# Patient Record
Sex: FEMALE | Race: WHITE | HISPANIC OR LATINO | URBAN - METROPOLITAN AREA
[De-identification: names, ages, dates, MRNs, and addresses within clinical notes are randomized per-mention and may not be internally consistent; named-entity substitution may affect disease eponyms.]

---

## 2023-10-31 ENCOUNTER — EMERGENCY (EMERGENCY)
Facility: HOSPITAL | Age: 68
LOS: 1 days | Discharge: HOME | End: 2023-10-31
Attending: EMERGENCY MEDICINE
Payer: MEDICAID

## 2023-10-31 VITALS
HEART RATE: 78 BPM | WEIGHT: 161.82 LBS | RESPIRATION RATE: 17 BRPM | OXYGEN SATURATION: 98 % | TEMPERATURE: 98 F | SYSTOLIC BLOOD PRESSURE: 156 MMHG | DIASTOLIC BLOOD PRESSURE: 73 MMHG

## 2023-10-31 PROCEDURE — 99283 EMERGENCY DEPT VISIT LOW MDM: CPT

## 2023-10-31 PROCEDURE — 96372 THER/PROPH/DIAG INJ SC/IM: CPT

## 2023-10-31 PROCEDURE — T1013: CPT

## 2023-10-31 PROCEDURE — 99284 EMERGENCY DEPT VISIT MOD MDM: CPT

## 2023-10-31 RX ORDER — LIDOCAINE 4 G/100G
1 CREAM TOPICAL
Qty: 1 | Refills: 0
Start: 2023-10-31 | End: 2023-11-02

## 2023-10-31 RX ORDER — LIDOCAINE 4 G/100G
1 CREAM TOPICAL ONCE
Refills: 0 | Status: COMPLETED | OUTPATIENT
Start: 2023-10-31 | End: 2023-10-31

## 2023-10-31 RX ORDER — ACETAMINOPHEN 500 MG
650 TABLET ORAL ONCE
Refills: 0 | Status: COMPLETED | OUTPATIENT
Start: 2023-10-31 | End: 2023-10-31

## 2023-10-31 RX ORDER — IBUPROFEN 200 MG
1 TABLET ORAL
Qty: 9 | Refills: 0
Start: 2023-10-31 | End: 2023-11-02

## 2023-10-31 RX ORDER — KETOROLAC TROMETHAMINE 30 MG/ML
15 SYRINGE (ML) INJECTION ONCE
Refills: 0 | Status: DISCONTINUED | OUTPATIENT
Start: 2023-10-31 | End: 2023-10-31

## 2023-10-31 RX ADMIN — LIDOCAINE 1 PATCH: 4 CREAM TOPICAL at 10:33

## 2023-10-31 RX ADMIN — Medication 650 MILLIGRAM(S): at 10:33

## 2023-10-31 RX ADMIN — Medication 15 MILLIGRAM(S): at 10:33

## 2023-10-31 NOTE — ED ADULT NURSE NOTE - PRO INTERPRETER NEED 2
Cimetidine Counseling:  I discussed with the patient the risks of Cimetidine including but not limited to gynecomastia, headache, diarrhea, nausea, drowsiness, arrhythmias, pancreatitis, skin rashes, psychosis, bone marrow suppression and kidney toxicity. [place]

## 2023-10-31 NOTE — ED PROVIDER NOTE - ATTENDING APP SHARED VISIT CONTRIBUTION OF CARE
: Mari  Reports pain in the neck for approximately 1 week.  Went to bed and awoke with pain the next morning.  Denies trauma or injury.  Has been taking Tylenol 2 pills, 2 times a day with limited relief.  Denies numbness or tingling of hands.  Denies weakness of hands.  Denies fever.  Denies sore throat.  Denies headache.  Physical examination: No midline cervical TTP, neck F ROM, C4-T1 motor/sensory 5/5 and equal bilateral, no carotid bruits.  Reports symptom improvement with medications administered in the emergency department. A/P: Neck pain, likely torticollis.  Anticipatory guidance provided supportive care recommended

## 2023-10-31 NOTE — ED ADULT TRIAGE NOTE - CHIEF COMPLAINT QUOTE
Pt c/o worsening posterior neck pain radiating to bilateral shoulders x 1 week.  Pt denies trauma/injury.  Pt taking tylenol and aleve without relief; last dose 2 days ago

## 2023-10-31 NOTE — ED ADULT NURSE NOTE - OBJECTIVE STATEMENT
68 yof presents to ed with neck pain with tenderness to top of shoulders x 1 week, worsening and no improvement with tylenol at home. denies fall/ trauma

## 2023-10-31 NOTE — ED PROVIDER NOTE - OBJECTIVE STATEMENT
68 yr old female with no PMHx presenting w/ neck and shoulder pain that started 1 week ago. She states that her pain is constant and associated to sharp headaches that happen at random not relieved by tylenol or motrin. Last took medications 2 days ago as they are hard on her stomach. She added that when she lays down she feels a lump sensation in her throat and wakes up with a very dry mouth. Endorses some weight gain, unsure how much. Also endorses family hx of father with brain tumor at 60 yrs old. Denies trauma, steroids, IVDU, personal hx of cancer, WL. No fevers, No Dizziness, No fever/chills, No photophobia/eye pain/changes in vision, No chest pain/palpitations, no SOB/cough/wheeze/stridor, No abdominal pain, No N/V/D, no dysuria/frequency/discharge, No neck/back pain, no rash, no changes in neurological status/function. 68 yr old female with no PMHx presenting w/ neck and shoulder pain that started 1 week ago. She states that her pain is constant and associated to sharp headaches that happen at random not relieved by tylenol or motrin. Last took medications 2 days ago as they are hard on her stomach. She added that when she lays down she feels a lump sensation in her throat and wakes up with a very dry mouth. Endorses some weight gain, unsure how much. Also endorses family hx of father with brain tumor at 60 yrs old. Denies trauma, steroids, IVDU, personal hx of cancer, WL. No fevers, No Dizziness, No fever/chills, No photophobia/eye pain/changes in vision, No difficulty swallowing or hoarseness, No chest pain/palpitations, no SOB/cough/wheeze/stridor, No abdominal pain, No N/V/D, no dysuria/frequency/discharge, No neck/back pain, no rash, no changes in neurological status/function.

## 2023-10-31 NOTE — ED PROVIDER NOTE - CLINICAL SUMMARY MEDICAL DECISION MAKING FREE TEXT BOX
68 yr old female with no PMHx. 68 yr old female with no PMHx presenting w/ one week of constant neck and shoulder pain. Cervical paraspinal and occipital TTp b/l w/ Normal neuro exam. No red flags (fever, ivdu, personal hx of cancer, steroids use, WL, visual changes). Denies trauma. Last took pain control 2 days ago w/ little relief. Likely MSK pain or tension HA. Other DDx include mass cervical spine. Patient with HTN and weight gain can't rule out EMMANUEL. Will give toradol, tylenol and lido patch here and reassess. 68 yr old female with no PMHx presenting w/ one week of constant neck and shoulder pain. Cervical paraspinal and occipital TTp b/l w/ Normal neuro exam. No red flags (fever, ivdu, personal hx of cancer, steroids use, WL, visual changes). Denies trauma. Last took pain control 2 days ago w/ little relief. Likely MSK pain or tension HA. Patient with HTN and weight gain can't rule out EMMANUEL. Must FU with PCP Will give toradol, tylenol and lido patch here and reassess. Dispo: likely home.

## 2023-10-31 NOTE — ED ADULT NURSE NOTE - NSFALLUNIVINTERV_ED_ALL_ED
Bed/Stretcher in lowest position, wheels locked, appropriate side rails in place/Call bell, personal items and telephone in reach/Instruct patient to call for assistance before getting out of bed/chair/stretcher/Non-slip footwear applied when patient is off stretcher/[place] to call system/Physically safe environment - no spills, clutter or unnecessary equipment/Purposeful proactive rounding/Room/bathroom lighting operational, light cord in reach

## 2023-10-31 NOTE — ED PROVIDER NOTE - NSFOLLOWUPINSTRUCTIONS_ED_ALL_ED_FT
- Stephany un seguimiento con [place] médico de atención primaria en 2-3 días y traiga morales copia de yani resultados a la mario de seguimiento.      - Para el dolor Tylenol (acetaminofén) 500-1000 mg cada 6-8 horas según sea necesario y/o Motrin (ibuprofeno/Advil) de venta balbina 400-600 mg cada 6 horas según sea necesario, tómelo con alimentos. También puede usar parches de lidocaína según las indicaciones.      - Regrese al servicio de urgencias si tiene alguna inquietud que se detalla a continuación: fiebre, aumento de los mareos, cambios en la visión, entumecimiento, dificultad para hablar, debilidad, dificultad para respirar, dolor en el pecho, intestino, incontinencia urinaria o cualquier síntoma nuevo o que empeore.

## 2023-10-31 NOTE — ED PROVIDER NOTE - PATIENT PORTAL LINK FT
You can access the FollowMyHealth Patient Portal offered by [facility] by registering at the following website: [email or web address]. By joining [employer]’s FollowMyHealth portal, you will also be able to view your health information using other applications (apps) compatible with our system.

## 2023-10-31 NOTE — ED PROVIDER NOTE - PROGRESS NOTE DETAILS
Ritu Dunn PA-C: Patient endorses feeling improved after emdications. Patient with capacity, ambulatory before and after visit. Discussed results and outcome of testing with the patient. Patient understands FU instructions, including prescription medication instructions, and return precautions. Patient advised to please follow up with their primary care doctor within the next 24 hours for HTN and to r/o EMMANUEL and return to the Emergency Department for worsening symptoms or any other concerns.

## 2023-10-31 NOTE — ED PROVIDER NOTE - PHYSICAL EXAMINATION
GEN: Pt in NAD, A&O x3. GCS 15  EYES: Sclera white w/o injection, PERRLA, EOMI.  ENT: Head NCAT. Mouth and pharynx without erythema or exudates, uvula midline, no tonsillar enlargement. Neck supple FROM. Thyroid not palpable. No carotid bruit.  RESP: No chest wall tenderness, CTA b/l, no wheezes, rales, or rhonchi.   CARDIAC: RRR, clear distinct S1, S2, no murmurs, gallops, or rubs.   ABD: Abdomen non-distended, soft, non-tender, no rebound or guarding.   VASC: 2+ radial pulses b/l.   NEURO: CN 3-12 grossly intact. Normal and equal sensation UE, LE and face b/l. 5/5 strength UE and LE b/l. No dysmetria. Pronator drift negative. Normal gait and gross cerebellar functioning.  MSK: Cervical Paraspinal and b/l occipital TTP. Limited Neck ROM w/ pain. No joint erythema or obvious deformities. Spine without obvious deformity. No midline tenderness. FROM w/o pain of upper extremities b/l.

## 2023-10-31 NOTE — ED PROVIDER NOTE - NS ED ATTENDING STATEMENT MOD
This was a shared visit with the YUMI. I reviewed and verified the documentation and independently performed the documented: